# Patient Record
Sex: FEMALE | Race: WHITE | Employment: OTHER | URBAN - METROPOLITAN AREA
[De-identification: names, ages, dates, MRNs, and addresses within clinical notes are randomized per-mention and may not be internally consistent; named-entity substitution may affect disease eponyms.]

---

## 2019-04-01 ENCOUNTER — THERAPY VISIT (OUTPATIENT)
Dept: PHYSICAL THERAPY | Facility: CLINIC | Age: 69
End: 2019-04-01
Payer: COMMERCIAL

## 2019-04-01 DIAGNOSIS — N39.46 MIXED INCONTINENCE URGE AND STRESS (MALE)(FEMALE): ICD-10-CM

## 2019-04-01 DIAGNOSIS — R10.2 PELVIC PAIN IN FEMALE: ICD-10-CM

## 2019-04-01 DIAGNOSIS — M62.89 PELVIC FLOOR DYSFUNCTION: ICD-10-CM

## 2019-04-01 PROCEDURE — 97535 SELF CARE MNGMENT TRAINING: CPT | Mod: GP | Performed by: PHYSICAL THERAPIST

## 2019-04-01 PROCEDURE — 97161 PT EVAL LOW COMPLEX 20 MIN: CPT | Mod: GP | Performed by: PHYSICAL THERAPIST

## 2019-04-01 PROCEDURE — 97140 MANUAL THERAPY 1/> REGIONS: CPT | Mod: GP | Performed by: PHYSICAL THERAPIST

## 2019-04-01 NOTE — PROGRESS NOTES
"[facility] Initial Evaluation  Subjective:  The history is provided by the patient. No  was used.   Phoebe Shah is a 69 year old female with a incontinence and pelvic dysfunction (pelvic pain) condition.  Condition occurred with:  Insidious onset.  Condition occurred: for unknown reasons.  This is a chronic condition  Date of MD order for this episode was 3-7-19. Phoebe c/o a weak bladder and many bladder infections for 15 years or more, and states it  seems to be getting worse. She did see a urologist and was referred to PT. The cystoscopy and CT were WNL.   Bladder- urine leakage with stress. States she wears 1 pad per day can be damp or wet. She never has to change her clothing with the pad on. Phoebe states she also has urgency to void with occasional urge incontinence. The stream is strong, she may have some dribbles after voiding. The stream can stop/start at times. Void times day are every 2 hours, but sometimes can be hourly. She is just up 1x at night to void. No pad use at night and no leaks.  Bowels-diarrhea frequently in the morning, daily. Starts out type 3 and ends up type 7 on bristol stool chart. She tries to be home in the morning because of this. Does not pay much attention to fiber and the 2 types.   Fluids-2 cups coffee(16 oz), diet pepsi 3 cans(1 with caffeine, 2 without), no water('makes me gag\"), 1 glass milk in am(instant breakfast). We did discuss healthy fluids and reasoning for this.   Exercise-none except household activities.   She did have pain with internal exam by the MD and PT today  Not currently sexually active  , vaginal, healed normally.    Patient reports pain:  Other (vaginal pain with MD exam).    Pain is described as sharp and aching  and reported as 4/10 (with internal vaginal exam).     Symptoms are exacerbated by coughing, laughing, stress and other (lifting) and relieved by nothing.  Since onset symptoms are gradually " worsening.        General health as reported by patient is good.  Pertinent medical history includes:  Depression, high blood pressure, incontinence, migraines/headaches, osteoarthritis, osteoporosis, smoking and overweight.  Medical allergies: none.  Other surgeries include:  Orthopedic surgery (cervical fusion, hysterectomy at 27).  Current medications:  Anti-depressants, meds to increase bone density and high blood pressure medication.  Current occupation is retired.    Primary job tasks include:  Other (home tasks).    Barriers include:  None as reported by the patient.    Red flags:  None as reported by the patient.                        Objective:  System                                 Pelvic Dysfunction Evaluation:        Flexibility:    Tightness present at:Hamstrings and Gluteals  Tightness not present at:  Adductors; Iliopsoas or Piriformis    Abdominal Wall:  Abdominal wall pelvic: lacks good abdominal tone/control, decreased mobility R lower quarter abdomen.      Scar Mobility:  Moderately tight lower abdominal scar moving proximal  Pelvic Clock Exam:    Ischiocavernosis pain:  -  Bulbocavernosis pain:  -  Transverse Perineal:  -  Levator ANI:  -  Perineal Body:  -  SI Provocation:    Positive for: ASLR  Negative for:  SI Compression and Fabres        External Assessment:    Skin Condition:  Normal      Tissue Symmetry:  Normal    Muscle Contraction/Perineal Mobility:  No movement  Internal Assessment:  Internal assessment pelvic: increased PF tone, painful especially R OI and LA, L OI mildly painful. Was able to understand/demo dropping/opening of the PFM (this is HEP)    Contraction/Grade:  Weak squeeze, 2 second hold (2)          SEMG Biofeedback:  Semg biofeedback pelvic: deferred, will work on strength after reduction in pain/increased tone.                  Additional History:  Delivery History:  Vaginal delivery  Number of Pregnancies: 5  Number of Live Births: 3  Caffeine Consumption:  2  coffee, 3 pop(no water)          Hip Evaluation    Hip Strength:      Extension:  Right: 3+/5    Pain:    Abduction:  Left: 3+/5     Pain:Right: 4+/5    Pain:  Adduction:  Left: 4+/5    Pain:                               General     ROS    Assessment/Plan:    Patient is a 69 year old female with pelvic complaints.    Patient has the following significant findings with corresponding treatment plan.                Diagnosis 1:  Mixed incontinence, pelvic pain, pelvic floor dysfunction  Pain -  manual therapy, self management, education and home program  Decreased ROM/flexibility - manual therapy, therapeutic exercise and home program  Decreased proprioception - neuro re-education, therapeutic activities and home program  Impaired muscle performance - biofeedback, neuro re-education and home program  Decreased function - therapeutic activities and home program    Therapy Evaluation Codes:   1) History comprised of:   Personal factors that impact the plan of care:      Age and Time since onset of symptoms.    Comorbidity factors that impact the plan of care are:      Depression, Smoking and osteoporosis.     Medications impacting care: Anti-depressant, Bone density and High blood pressure.  2) Examination of Body Systems comprised of:   Body structures and functions that impact the plan of care:      Pelvis.   Activity limitations that impact the plan of care are:      Frequency, Pelvic Exam, Stress incontinence, Urgency and Urge incontinence.  3) Clinical presentation characteristics are:   Stable/Uncomplicated.  4) Decision-Making    Low complexity using standardized patient assessment instrument and/or measureable assessment of functional outcome.  Cumulative Therapy Evaluation is: Low complexity.    Previous and current functional limitations:  (See Goal Flow Sheet for this information)    Short term and Long term goals: (See Goal Flow Sheet for this information)     Communication ability:  Patient appears to be  able to clearly communicate and understand verbal and written communication and follow directions correctly.  Treatment Explanation - The following has been discussed with the patient:   RX ordered/plan of care  Anticipated outcomes  Possible risks and side effects  This patient would benefit from PT intervention to resume normal activities.   Rehab potential is good.    Frequency:  1 X week, once daily  Duration:  for 6 weeks  Discharge Plan:  Achieve all LTG.  Independent in home treatment program.  Reach maximal therapeutic benefit.    Please refer to the daily flowsheet for treatment today, total treatment time and time spent performing 1:1 timed codes.

## 2019-04-01 NOTE — LETTER
"[facility] [place] [facility]  [address]  [address]  [place] [address]  [phone]    2019    Re: Phoebe Shah   :   1950  MRN:  [id number]   REFERRING PHYSICIAN:   Elinor Carreon    [facility]    Date of Initial Evaluation:  19  Visits:  Rxs Used: 1  Reason for Referral:     Pelvic floor dysfunction  Mixed incontinence urge and stress (male)(female)  Pelvic pain in female    EVALUATION SUMMARY    [facility] Initial Evaluation  Subjective:  The history is provided by the patient. No  was used.   Phoebe Shah is a 69 year old female with a incontinence and pelvic dysfunction (pelvic pain) condition.  Condition occurred with:  Insidious onset.  Condition occurred: for unknown reasons.  This is a chronic condition  Date of MD order for this episode was 3-7-19. Phoebe c/o a weak bladder and many bladder infections for 15 years or more, and states it  seems to be getting worse. She did see a urologist and was referred to PT. The cystoscopy and CT were WNL.   Bladder- urine leakage with stress. States she wears 1 pad per day can be damp or wet. She never has to change her clothing with the pad on. Phoebe states she also has urgency to void with occasional urge incontinence. The stream is strong, she may have some dribbles after voiding. The stream can stop/start at times. Void times day are every 2 hours, but sometimes can be hourly. She is just up 1x at night to void. No pad use at night and no leaks.  Bowels-diarrhea frequently in the morning, daily. Starts out type 3 and ends up type 7 on bristol stool chart. She tries to be home in the morning because of this. Does not pay much attention to fiber and the 2 types.   Fluids-2 cups coffee(16 oz), diet pepsi 3 cans(1 with caffeine, 2 without), no water('makes me gag\"), 1 glass milk in am(instant breakfast). We did discuss healthy fluids and " reasoning for this.   Exercise-none except household activities.   She did have pain with internal exam by the MD and PT today  Not currently sexually active  , vaginal, healed normally.    Patient reports pain:  Other (vaginal pain with MD exam).    Pain is described as sharp and aching  and reported as 4/10 (with internal vaginal exam).     Symptoms are exacerbated by coughing, laughing, stress and other (lifting) and relieved by nothing.  Since onset symptoms are gradually worsening.        General health as reported by patient is good.  Pertinent medical history includes:  Depression, high blood pressure, incontinence, migraines/headaches, osteoarthritis, osteoporosis, smoking and   Phoebe Pablo   :   1950        overweight.  Medical allergies: none.  Other surgeries include:  Orthopedic surgery (cervical fusion, hysterectomy at 27).  Current medications:  Anti-depressants, meds to increase bone density and high blood pressure medication.  Current occupation is retired.    Primary job tasks include:  Other (home tasks).    Barriers include:  None as reported by the patient.    Red flags:  None as reported by the patient.    Objective:  System       Pelvic Dysfunction Evaluation:    Flexibility:    Tightness present at:Hamstrings and Gluteals  Tightness not present at:  Adductors; Iliopsoas or Piriformis  Abdominal Wall:  Abdominal wall pelvic: lacks good abdominal tone/control, decreased mobility R lower quarter abdomen.  Scar Mobility:  Moderately tight lower abdominal scar moving proximal  Pelvic Clock Exam:    Ischiocavernosis pain:  -  Bulbocavernosis pain:  -  Transverse Perineal:  -  Levator ANI:  -  Perineal Body:  -  SI Provocation:    Positive for: ASLR  Negative for:  SI Compression and Fabres  External Assessment:    Skin Condition:  Normal  Tissue Symmetry:  Normal  Muscle Contraction/Perineal Mobility:  No movement  Internal Assessment:  Internal assessment pelvic: increased PF tone,  painful especially R OI and LA, L OI mildly painful. Was able to understand/demo dropping/opening of the PFM (this is HEP)  Contraction/Grade:  Weak squeeze, 2 second hold (2)  SEMG Biofeedback:  Semg biofeedback pelvic: deferred, will work on strength after reduction in pain/increased tone.  Additional History:  Delivery History:  Vaginal delivery  Number of Pregnancies: 5  Number of Live Births: 3  Caffeine Consumption:  2 coffee, 3 pop(no water)          Hip Evaluation    Hip Strength:      Extension:  Right: 3+/5    Pain:    Abduction:  Left: 3+/5     Pain:Right: 4+/5    Pain:  Adduction:  Left: 4+/5    Pain:      Assessment/Plan:    Patient is a 69 year old female with pelvic complaints.    Patient has the following significant findings with corresponding treatment plan.                Diagnosis 1:  Mixed incontinence, pelvic pain, pelvic floor dysfunction  Pain -  manual therapy, self management, education and home program  Decreased ROM/flexibility - manual therapy, therapeutic exercise and home program  Decreased proprioception - neuro re-education, therapeutic activities and home program  Impaired muscle performance - biofeedback, neuro re-education and home program  Decreased function - therapeutic activities and home program    Previous and current functional limitations:  (See Goal Flow Sheet for this information)    Short term and Long term goals: (See Goal Flow Sheet for this information)     Communication ability:  Patient appears to be able to clearly communicate and understand verbal and written communication and follow directions correctly.  Treatment Explanation - The following has been discussed with the patient:   RX ordered/plan of care  Anticipated outcomes  Possible risks and side effects  This patient would benefit from PT intervention to resume normal activities.   Rehab potential is good.    Frequency:  1 X week, once daily  Duration:  for 6 weeks  Discharge Plan:  Achieve all  LTG.  Independent in home treatment program.  Reach maximal therapeutic benefit.            Thank you for your referral.    INQUIRIES  Therapist: Melissa Grier PT  [facility] [place] [facility]  [address]  [place] [address]  Phone: [phone]  Fax: [phone]

## 2019-04-16 ENCOUNTER — THERAPY VISIT (OUTPATIENT)
Dept: PHYSICAL THERAPY | Facility: CLINIC | Age: 69
End: 2019-04-16
Payer: COMMERCIAL

## 2019-04-16 DIAGNOSIS — N39.46 MIXED INCONTINENCE URGE AND STRESS (MALE)(FEMALE): ICD-10-CM

## 2019-04-16 DIAGNOSIS — M62.89 PELVIC FLOOR DYSFUNCTION: ICD-10-CM

## 2019-04-16 DIAGNOSIS — R10.2 PELVIC PAIN IN FEMALE: ICD-10-CM

## 2019-04-16 PROCEDURE — 97110 THERAPEUTIC EXERCISES: CPT | Mod: GP | Performed by: PHYSICAL THERAPIST

## 2019-04-16 PROCEDURE — 97112 NEUROMUSCULAR REEDUCATION: CPT | Mod: GP | Performed by: PHYSICAL THERAPIST

## 2019-04-16 PROCEDURE — 97140 MANUAL THERAPY 1/> REGIONS: CPT | Mod: GP | Performed by: PHYSICAL THERAPIST

## 2019-04-23 ENCOUNTER — THERAPY VISIT (OUTPATIENT)
Dept: PHYSICAL THERAPY | Facility: CLINIC | Age: 69
End: 2019-04-23
Payer: COMMERCIAL

## 2019-04-23 DIAGNOSIS — N39.46 MIXED INCONTINENCE URGE AND STRESS (MALE)(FEMALE): ICD-10-CM

## 2019-04-23 DIAGNOSIS — M62.89 PELVIC FLOOR DYSFUNCTION: ICD-10-CM

## 2019-04-23 DIAGNOSIS — R10.2 PELVIC PAIN IN FEMALE: ICD-10-CM

## 2019-04-23 PROCEDURE — 97112 NEUROMUSCULAR REEDUCATION: CPT | Mod: GP | Performed by: PHYSICAL THERAPIST

## 2019-04-23 PROCEDURE — 97110 THERAPEUTIC EXERCISES: CPT | Mod: GP | Performed by: PHYSICAL THERAPIST

## 2019-04-23 PROCEDURE — 97140 MANUAL THERAPY 1/> REGIONS: CPT | Mod: GP | Performed by: PHYSICAL THERAPIST

## 2019-05-07 ENCOUNTER — THERAPY VISIT (OUTPATIENT)
Dept: PHYSICAL THERAPY | Facility: CLINIC | Age: 69
End: 2019-05-07
Payer: COMMERCIAL

## 2019-05-07 DIAGNOSIS — N39.46 MIXED INCONTINENCE URGE AND STRESS (MALE)(FEMALE): ICD-10-CM

## 2019-05-07 DIAGNOSIS — M62.89 PELVIC FLOOR DYSFUNCTION: ICD-10-CM

## 2019-05-07 DIAGNOSIS — R10.2 PELVIC PAIN IN FEMALE: ICD-10-CM

## 2019-05-07 PROCEDURE — 97110 THERAPEUTIC EXERCISES: CPT | Mod: GP | Performed by: PHYSICAL THERAPIST

## 2019-05-07 PROCEDURE — 97112 NEUROMUSCULAR REEDUCATION: CPT | Mod: GP | Performed by: PHYSICAL THERAPIST

## 2019-05-07 NOTE — LETTER
[facility] [place] [facility]  [address]  [address]  [place] [address]  [phone]    May 7, 2019    Re: Phoebe Shah   :   1950  MRN:  [id number]   REFERRING PHYSICIAN:   Elinor Carreon    [facility]    Date of Initial Evaluation: 19  Visits:  Rxs Used: 4  Reason for Referral:     Pelvic floor dysfunction  Mixed incontinence urge and stress (male)(female)  Pelvic pain in female    DISCHARGE REPORT    Progress reporting period is from 19 to 19, 4 visits.       SUBJECTIVE  Subjective changes noted by patient:  .  Subjective: States the urinary leakage is improving slowly(about  2-3x/day). The pad is damp, using 1-2 per day(2nd just for freshness). No bladder infection since thebegin of march. Fluids-water in am and pm. 25 oz, also 3 cans pop, coffee 1.  She has increased her water since initial and is still working on it. Phoebe states she would like to finish with PT today due to her high copay cost. She feels she has the tools to continue with her HEP.  No post void dribble as initial, urgency is under control, void times 2-3 hrs.     Current Pain level: (internal R 3/10 L 0/10).      Initial Pain level: (internal with palpation R OI 5/10, L 1/10).   Changes in function:  Yes (See Goal flowsheet attached for changes in current functional level)  Adverse reaction to treatment or activity: None    OBJECTIVE  Changes noted in objective findings:  Yes,   Objective: Phoebe has mild pain with palpation R internal vaginal mm's, none on L side. She is able to get weak activation of the PFM on both sides with some verbal and tactile cues. When put on BF today she was able to activate and release the PFM(without subst) and endurance was about 5-8 sec. Overall she is quite weak in phasic and tonic activation. This was in supine.      ASSESSMENT/PLAN  Updated problem list and treatment plan: Diagnosis 1:  Mixed incontinence, pelvic  pain  Pain -  self management and home program  Decreased ROM/flexibility - therapeutic exercise and home program  Decreased strength - therapeutic exercise, therapeutic activities and home program  Decreased proprioception - neuro re-education, therapeutic activities and home program    Phoebe Shah   :   1950          Impaired muscle performance - neuro re-education and home program  Decreased function - therapeutic activities and home program  STG/LTGs have been met or progress has been made towards goals:  Yes (See Goal flow sheet completed today.)  Assessment of Progress: The patient's condition is improving.  Self Management Plans:  Patient has been instructed in a home treatment program.  Patient  has been instructed in self management of symptoms.  I have re-evaluated this patient and find that the nature, scope, duration and intensity of the therapy is appropriate for the medical condition of the patient.  Phoebe continues to require the following intervention to meet STG and LTG's:  PT    Recommendations:  Phoebe would benefit from continued PT. She states she would like to continue on her own due to her insurance copay. Did discuss at length that she will need to continue with her home program to improve and what specifically she needs to do to accomplish this            Thank you for your referral.    INQUIRIES  Therapist: Melissa Grier, PT  [facility] [place] [facility]  [address]  [address]  [place] [address]  Phone: [phone]  Fax: [phone]

## 2019-05-07 NOTE — PROGRESS NOTES
Subjective:  HPI                    Objective:  System    Physical Exam    General     ROS    Assessment/Plan:    DISCHARGE REPORT    Progress reporting period is from 4-1-19 to 5-7-19, 4 visits.       SUBJECTIVE  Subjective changes noted by patient:  .  Subjective: States the urinary leakage is improving slowly(about  2-3x/day). The pad is damp, using 1-2 per day(2nd just for freshness). No bladder infection since thebeginning of march. Fluids-water in am and pm. 25 oz, also 3 cans pop, coffee 1.  She has increased her water since initial and is still working on it. Phoebe states she would like to finish with PT today due to her high copay cost. She feels she has the tools to continue with her HEP.  No post void dribble as initial, urgency is under control, void times 2-3 hrs.     Current Pain level: (internal R 3/10 L 0/10).      Initial Pain level: (internal with palpation R OI 5/10, L 1/10).   Changes in function:  Yes (See Goal flowsheet attached for changes in current functional level)  Adverse reaction to treatment or activity: None    OBJECTIVE  Changes noted in objective findings:  Yes,   Objective: Phoebe has mild pain with palpation R internal vaginal mm's, none on L side. She is able to get weak activation of the PFM on both sides with some verbal and tactile cues. When put on BF today she was able to activate and release the PFM(without subst) and endurance was about 5-8 sec. Overall she is quite weak in phasic and tonic activation. This was in supine.      ASSESSMENT/PLAN  Updated problem list and treatment plan: Diagnosis 1:  Mixed incontinence, pelvic pain  Pain -  self management and home program  Decreased ROM/flexibility - therapeutic exercise and home program  Decreased strength - therapeutic exercise, therapeutic activities and home program  Decreased proprioception - neuro re-education, therapeutic activities and home program  Impaired muscle performance - neuro re-education and home  program  Decreased function - therapeutic activities and home program  STG/LTGs have been met or progress has been made towards goals:  Yes (See Goal flow sheet completed today.)  Assessment of Progress: The patient's condition is improving.  Self Management Plans:  Patient has been instructed in a home treatment program.  Patient  has been instructed in self management of symptoms.  I have re-evaluated this patient and find that the nature, scope, duration and intensity of the therapy is appropriate for the medical condition of the patient.  Phoebe continues to require the following intervention to meet STG and LTG's:  PT    Recommendations:  Phoebe would benefit from continued PT. She states she would like to continue on her own due to her insurance copay. Did discuss at length that she will need to continue with her home program to improve and what specifically she needs to do to accomplish this    Please refer to the daily flowsheet for treatment today, total treatment time and time spent performing 1:1 timed codes.